# Patient Record
Sex: FEMALE
[De-identification: names, ages, dates, MRNs, and addresses within clinical notes are randomized per-mention and may not be internally consistent; named-entity substitution may affect disease eponyms.]

---

## 2019-09-26 PROBLEM — Z00.00 ENCOUNTER FOR PREVENTIVE HEALTH EXAMINATION: Status: ACTIVE | Noted: 2019-09-26

## 2019-10-22 ENCOUNTER — APPOINTMENT (OUTPATIENT)
Dept: NEUROLOGY | Facility: CLINIC | Age: 79
End: 2019-10-22
Payer: MEDICARE

## 2019-10-22 VITALS
HEART RATE: 76 BPM | HEIGHT: 60 IN | OXYGEN SATURATION: 98 % | BODY MASS INDEX: 27.29 KG/M2 | SYSTOLIC BLOOD PRESSURE: 129 MMHG | WEIGHT: 139 LBS | DIASTOLIC BLOOD PRESSURE: 76 MMHG

## 2019-10-22 DIAGNOSIS — G43.909 MIGRAINE, UNSPECIFIED, NOT INTRACTABLE, W/OUT STATUS MIGRAINOSUS: ICD-10-CM

## 2019-10-22 DIAGNOSIS — Z78.9 OTHER SPECIFIED HEALTH STATUS: ICD-10-CM

## 2019-10-22 DIAGNOSIS — Z86.39 PERSONAL HISTORY OF OTHER ENDOCRINE, NUTRITIONAL AND METABOLIC DISEASE: ICD-10-CM

## 2019-10-22 DIAGNOSIS — Z82.49 FAMILY HISTORY OF ISCHEMIC HEART DISEASE AND OTHER DISEASES OF THE CIRCULATORY SYSTEM: ICD-10-CM

## 2019-10-22 PROCEDURE — 99204 OFFICE O/P NEW MOD 45 MIN: CPT

## 2019-10-22 RX ORDER — BUTALBIT/ACETAMIN/CAFF/CODEINE 50-325-30
50-325-40-30 CAPSULE ORAL
Refills: 0 | Status: ACTIVE | COMMUNITY

## 2019-10-22 RX ORDER — ROSUVASTATIN CALCIUM 5 MG/1
TABLET, FILM COATED ORAL
Refills: 0 | Status: ACTIVE | COMMUNITY

## 2019-10-22 RX ORDER — AMITRIPTYLINE HYDROCHLORIDE 75 MG/1
TABLET, FILM COATED ORAL
Refills: 0 | Status: ACTIVE | COMMUNITY

## 2019-10-22 RX ORDER — EZETIMIBE 10 MG/1
10 TABLET ORAL
Refills: 0 | Status: ACTIVE | COMMUNITY

## 2019-10-22 NOTE — CONSULT LETTER
[Dear  ___] : Dear  [unfilled], [Consult Letter:] : I had the pleasure of evaluating your patient, [unfilled]. [Please see my note below.] : Please see my note below. [Consult Closing:] : Thank you very much for allowing me to participate in the care of this patient.  If you have any questions, please do not hesitate to contact me. [Sincerely,] : Sincerely, [FreeTextEntry3] : Aileen Hernandez MD, MPH\par

## 2019-10-22 NOTE — PHYSICAL EXAM
[General Appearance - Alert] : alert [General Appearance - In No Acute Distress] : in no acute distress [Person] : oriented to person [Place] : oriented to place [Time] : oriented to time [Registration Intact] : recent registration memory intact [Concentration Intact] : normal concentrating ability [Visual Intact] : visual attention was ~T not ~L decreased [Naming Objects] : no difficulty naming common objects [Repeating Phrases] : no difficulty repeating a phrase [Fluency] : fluency intact [Comprehension] : comprehension intact [Vocabulary] : adequate range of vocabulary [Cranial Nerves Optic (II)] : visual acuity intact bilaterally,  visual fields full to confrontation, pupils equal round and reactive to light [Cranial Nerves Oculomotor (III)] : extraocular motion intact [Cranial Nerves Trigeminal (V)] : facial sensation intact symmetrically [Cranial Nerves Facial (VII)] : face symmetrical [Cranial Nerves Vestibulocochlear (VIII)] : hearing was intact bilaterally [Cranial Nerves Glossopharyngeal (IX)] : tongue and palate midline [Cranial Nerves Accessory (XI - Cranial And Spinal)] : head turning and shoulder shrug symmetric [Cranial Nerves Hypoglossal (XII)] : there was no tongue deviation with protrusion [Motor Tone] : muscle tone was normal in all four extremities [Motor Strength] : muscle strength was normal in all four extremities [Involuntary Movements] : no involuntary movements were seen [Sensation Tactile Decrease] : light touch was intact [Abnormal Walk] : normal gait [Balance] : balance was intact [1+] : Ankle jerk left 1+ [Full Pulse] : the pedal pulses are present [Coordination - Dysmetria Impaired Finger-to-Nose Bilateral] : not present [Coordination - Dysmetria Impaired Heel-to-Shin Bilateral] : not present [Plantar Reflex Right Only] : normal on the right [Plantar Reflex Left Only] : normal on the left [FreeTextEntry5] : fundi not visualized

## 2019-10-22 NOTE — REVIEW OF SYSTEMS
[As Noted in HPI] : as noted in HPI [Fever] : no fever [Anxiety] : no anxiety [Eyesight Problems] : no eyesight problems [Earache] : no earache [Chest Pain] : no chest pain [Shortness Of Breath] : no shortness of breath [Constipation] : no constipation [Incontinence] : no incontinence [Joint Pain] : no joint pain [Itching] : no itching [Muscle Weakness] : no muscle weakness [Easy Bleeding] : no tendency for easy bleeding

## 2019-10-22 NOTE — HISTORY OF PRESENT ILLNESS
[FreeTextEntry1] : The patient is here for headaches since childhood.  The headaches are posterior and cervical pain, unable to clarify if pressure or throbbing, severe.  Associated with nausea and vomitting.  No photo or phonophobia.  No aura.  No aura, focal weakness or sensory loss with the headache.  Lasts for hours if it happens, patient usually needs to go to sleep.  Takes Fioricet 3 x/day for while and Amitriptyline 10mg HS for a while. Has son with headaches.\par \par Had MRI brain and EKG in the near past.

## 2019-12-05 ENCOUNTER — APPOINTMENT (OUTPATIENT)
Dept: NEUROLOGY | Facility: CLINIC | Age: 79
End: 2019-12-05
Payer: MEDICARE

## 2019-12-05 VITALS
SYSTOLIC BLOOD PRESSURE: 156 MMHG | HEART RATE: 81 BPM | HEIGHT: 60 IN | OXYGEN SATURATION: 98 % | WEIGHT: 139 LBS | BODY MASS INDEX: 27.29 KG/M2 | DIASTOLIC BLOOD PRESSURE: 90 MMHG

## 2019-12-05 DIAGNOSIS — G43.009 MIGRAINE W/OUT AURA, NOT INTRACTABLE, W/OUT STATUS MIGRAINOSUS: ICD-10-CM

## 2019-12-05 PROCEDURE — 99214 OFFICE O/P EST MOD 30 MIN: CPT

## 2019-12-05 RX ORDER — AMITRIPTYLINE HYDROCHLORIDE 25 MG/1
25 TABLET, FILM COATED ORAL
Qty: 30 | Refills: 5 | Status: ACTIVE | COMMUNITY
Start: 2019-12-05 | End: 1900-01-01

## 2019-12-05 NOTE — PHYSICAL EXAM
[General Appearance - Alert] : alert [General Appearance - In No Acute Distress] : in no acute distress [Place] : oriented to place [Person] : oriented to person [Time] : oriented to time [Registration Intact] : recent registration memory intact [Concentration Intact] : normal concentrating ability [Naming Objects] : no difficulty naming common objects [Repeating Phrases] : no difficulty repeating a phrase [Fluency] : fluency intact [Comprehension] : comprehension intact [Vocabulary] : adequate range of vocabulary [Cranial Nerves Optic (II)] : visual acuity intact bilaterally,  visual fields full to confrontation, pupils equal round and reactive to light [Cranial Nerves Oculomotor (III)] : extraocular motion intact [Cranial Nerves Trigeminal (V)] : facial sensation intact symmetrically [Cranial Nerves Facial (VII)] : face symmetrical [Motor Tone] : muscle tone was normal in all four extremities [Motor Strength] : muscle strength was normal in all four extremities [Sensation Tactile Decrease] : light touch was intact [Abnormal Walk] : normal gait [Balance] : balance was intact

## 2019-12-05 NOTE — HISTORY OF PRESENT ILLNESS
[FreeTextEntry1] : The patient is here for headaches since childhood. The headaches are posterior and cervical pain, unable to clarify if pressure or throbbing, severe. Associated with nausea and vomiting. No photo or phonophobia. No aura. No aura, focal weakness or sensory loss with the headache. Lasts for hours if it happens, patient usually needs to go to sleep. Takes Fioricet 3 x/day for while and Amitriptyline 10mg HS for a while. Has son with headaches.\par \par Had MRI brain and EKG, records reviewed.  No change of headache.\par \par

## 2019-12-05 NOTE — ASSESSMENT
[FreeTextEntry1] : Migraines without aura, controlled on Fiorecet 3 x/day for while and Amitriptyline 10mg HS for a while.  Will increase Amitriptyline from 10 to 25mg HS and tapering Fiorecet as Fiorecet can cause rebound headaches. Will consider switching from Fiorecet to Naproxyn for rescue once the patient is on prn or off Fiorecet.

## 2020-04-23 ENCOUNTER — APPOINTMENT (OUTPATIENT)
Dept: NEUROLOGY | Facility: CLINIC | Age: 80
End: 2020-04-23